# Patient Record
Sex: FEMALE | Race: WHITE | Employment: FULL TIME | URBAN - METROPOLITAN AREA
[De-identification: names, ages, dates, MRNs, and addresses within clinical notes are randomized per-mention and may not be internally consistent; named-entity substitution may affect disease eponyms.]

---

## 2021-10-07 ENCOUNTER — APPOINTMENT (OUTPATIENT)
Dept: GENERAL RADIOLOGY | Age: 41
End: 2021-10-07
Payer: COMMERCIAL

## 2021-10-07 ENCOUNTER — HOSPITAL ENCOUNTER (EMERGENCY)
Age: 41
Discharge: HOME | End: 2021-10-07
Attending: EMERGENCY MEDICINE
Payer: COMMERCIAL

## 2021-10-07 VITALS
SYSTOLIC BLOOD PRESSURE: 111 MMHG | BODY MASS INDEX: 25.76 KG/M2 | RESPIRATION RATE: 20 BRPM | TEMPERATURE: 98.8 F | HEIGHT: 62 IN | HEART RATE: 82 BPM | WEIGHT: 140 LBS | DIASTOLIC BLOOD PRESSURE: 75 MMHG | OXYGEN SATURATION: 100 %

## 2021-10-07 DIAGNOSIS — R07.9 CHEST PAIN, UNSPECIFIED TYPE: Primary | ICD-10-CM

## 2021-10-07 LAB
ANION GAP SERPL CALCULATED.3IONS-SCNC: 11 MMOL/L (ref 3–16)
BASOPHILS ABSOLUTE: 0.1 K/UL (ref 0–0.2)
BASOPHILS RELATIVE PERCENT: 0.6 %
BUN BLDV-MCNC: 14 MG/DL (ref 7–20)
CALCIUM SERPL-MCNC: 8.6 MG/DL (ref 8.3–10.6)
CHLORIDE BLD-SCNC: 106 MMOL/L (ref 99–110)
CO2: 23 MMOL/L (ref 21–32)
CREAT SERPL-MCNC: 0.7 MG/DL (ref 0.6–1.1)
EKG ATRIAL RATE: 82 BPM
EKG DIAGNOSIS: NORMAL
EKG P AXIS: 37 DEGREES
EKG P-R INTERVAL: 182 MS
EKG Q-T INTERVAL: 374 MS
EKG QRS DURATION: 78 MS
EKG QTC CALCULATION (BAZETT): 436 MS
EKG R AXIS: 57 DEGREES
EKG T AXIS: 39 DEGREES
EKG VENTRICULAR RATE: 82 BPM
EOSINOPHILS ABSOLUTE: 0.1 K/UL (ref 0–0.6)
EOSINOPHILS RELATIVE PERCENT: 1.3 %
GFR AFRICAN AMERICAN: >60
GFR NON-AFRICAN AMERICAN: >60
GLUCOSE BLD-MCNC: 69 MG/DL (ref 70–99)
HCT VFR BLD CALC: 36.7 % (ref 36–48)
HEMOGLOBIN: 12.5 G/DL (ref 12–16)
LYMPHOCYTES ABSOLUTE: 1.5 K/UL (ref 1–5.1)
LYMPHOCYTES RELATIVE PERCENT: 18.7 %
MAGNESIUM: 1.9 MG/DL (ref 1.8–2.4)
MCH RBC QN AUTO: 35.2 PG (ref 26–34)
MCHC RBC AUTO-ENTMCNC: 34.2 G/DL (ref 31–36)
MCV RBC AUTO: 103.1 FL (ref 80–100)
MONOCYTES ABSOLUTE: 0.4 K/UL (ref 0–1.3)
MONOCYTES RELATIVE PERCENT: 5.4 %
NEUTROPHILS ABSOLUTE: 5.8 K/UL (ref 1.7–7.7)
NEUTROPHILS RELATIVE PERCENT: 74 %
PDW BLD-RTO: 13.1 % (ref 12.4–15.4)
PHOSPHORUS: 2.5 MG/DL (ref 2.5–4.9)
PLATELET # BLD: 220 K/UL (ref 135–450)
PMV BLD AUTO: 6.9 FL (ref 5–10.5)
POTASSIUM SERPL-SCNC: 3.7 MMOL/L (ref 3.5–5.1)
PRO-BNP: 19 PG/ML (ref 0–124)
RBC # BLD: 3.56 M/UL (ref 4–5.2)
SODIUM BLD-SCNC: 140 MMOL/L (ref 136–145)
TROPONIN: <0.01 NG/ML
TROPONIN: <0.01 NG/ML
WBC # BLD: 7.9 K/UL (ref 4–11)

## 2021-10-07 PROCEDURE — 84100 ASSAY OF PHOSPHORUS: CPT

## 2021-10-07 PROCEDURE — 83880 ASSAY OF NATRIURETIC PEPTIDE: CPT

## 2021-10-07 PROCEDURE — 84484 ASSAY OF TROPONIN QUANT: CPT

## 2021-10-07 PROCEDURE — 83735 ASSAY OF MAGNESIUM: CPT

## 2021-10-07 PROCEDURE — 80048 BASIC METABOLIC PNL TOTAL CA: CPT

## 2021-10-07 PROCEDURE — 93005 ELECTROCARDIOGRAM TRACING: CPT | Performed by: EMERGENCY MEDICINE

## 2021-10-07 PROCEDURE — 99284 EMERGENCY DEPT VISIT MOD MDM: CPT

## 2021-10-07 PROCEDURE — 85025 COMPLETE CBC W/AUTO DIFF WBC: CPT

## 2021-10-07 PROCEDURE — 71045 X-RAY EXAM CHEST 1 VIEW: CPT

## 2021-10-07 ASSESSMENT — PAIN SCALES - GENERAL: PAINLEVEL_OUTOF10: 0

## 2021-10-07 NOTE — ED PROVIDER NOTES
[address] [facility]          ATTENDING PHYSICIAN NOTE       Date of evaluation: 10/7/2021    Chief Complaint     Chest Pain (had a brief episode of sudden onset chest left chest and breast)      History of Present Illness     Darrin Reyes is a 35 y.o. female who presents to the emergency department after an episode of sharp left-sided chest pain. The patient is a smoker does not have a known history of hypertension hyperlipidemia or diabetes he has no significant family history of early cardiac disease. Patient reports that she was at work this morning was on the phone talking when she had a sudden onset of left-sided sharp chest pain over her left breast which did not radiate she did feel somewhat diaphoretic and pale at that time with some nausea did not vomit. Reports no radiation to the pain does not recall any particular alleviating or aggravating factors. By the time EMS arrived the pain had remitted and now has completely gone. She denies any previous symptoms similar to this in the past.    Review of Systems     As documented in the HPI, otherwise all other systems were reviewed and were negative. Past Medical, Surgical, Family, and Social History     She has a past medical history of GERD (gastroesophageal reflux disease). She has no past surgical history on file. Her family history is not on file. She reports that she quit smoking about 3 months ago. Her smoking use included cigarettes. She has never used smokeless tobacco. She reports that she does not use drugs. Medications     Previous Medications    No medications on file       Allergies     She has No Known Allergies.     Physical Exam     INITIAL VITALS: BP: 114/75, Temp: 98.8 °F (37.1 °C), Pulse: 86, Resp: 16, SpO2: 100 %   General: 35year-old sitting in bed without significant cardiorespiratory distress  HEENT:  head is atraumatic, pupils equal round and reactive to light, sclera are clear, oropharynx Calcium 8.6 8.3 - 10.6 mg/dL   Phosphorus   Result Value Ref Range    Phosphorus 2.5 2.5 - 4.9 mg/dL   Magnesium   Result Value Ref Range    Magnesium 1.90 1.80 - 2.40 mg/dL   Troponin   Result Value Ref Range    Troponin <0.01 <0.01 ng/mL   Brain Natriuretic Peptide   Result Value Ref Range    Pro-BNP 19 0 - 124 pg/mL   Troponin   Result Value Ref Range    Troponin <0.01 <0.01 ng/mL   EKG 12 Lead   Result Value Ref Range    Ventricular Rate 82 BPM    Atrial Rate 82 BPM    P-R Interval 182 ms    QRS Duration 78 ms    Q-T Interval 374 ms    QTc Calculation (Bazett) 436 ms    P Axis 37 degrees    R Axis 57 degrees    T Axis 39 degrees    Diagnosis       EKG performed in ER and to be interpreted by ER physician. Confirmed by MD, ER ([id number]),  Alla Whatley ([id number]) on 10/7/2021 9:35:05 AM       RECENT VITALS:  BP: 105/79, Temp: 98.8 °F (37.1 °C), Pulse: 84, Resp: 21, SpO2: 100 %         ED Course     Nursing Notes, Past Medical Hx, Past Surgical Hx, Social Hx, Allergies, and Family Hx were reviewed. The patient was given the following medications:  No orders of the defined types were placed in this encounter. CONSULTS:  None    MEDICAL DECISION MAKING / ASSESSMENT / PLAN     Alysha Gerber is a 35 y.o. female who presented to the emerged part with a complaint of chest pain single episode left-sided that occurred approximately half hour prior to arrival in the emergency department. On my clinical assessment patient was overall well-appearing her initial EKG showed no evidence of any ischemic changes or significant abnormalities which would be indicative of active ischemia. The patient had laboratory investigations sent which showed a CBC with a hemoglobin of 12.5 white blood cell count of 7.9 basic metabolic profile with a glucose of 69 otherwise no significant abnormalities. BNP was 19 troponin was undetectable on initial values.   Magnesium and phosphorus were within normal limits chest x-ray showed no acute cardiopulmonary disease. A repeat troponin was sent 3 hours from the time of the patient's arrival in the emergency department and was also negative. Patient's heart score is 1 on the basis of her cardiovascular risk factors. She does have a primary care provider with whom she can follow-up for her pain and symptoms are resolved here in the emergency department and I feel that she is safe for follow-up as an outpatient. Clinical Impression     1.  Chest pain, unspecified type        Disposition     PATIENT REFERRED TO:  Burtis Lai, MD [id number] [facility] [phone]            DISCHARGE MEDICATIONS:  New Prescriptions    No medications on file       DISPOSITION           Aviva Ramesh MD  10/07/21 [id number]